# Patient Record
Sex: FEMALE | Race: WHITE | URBAN - METROPOLITAN AREA
[De-identification: names, ages, dates, MRNs, and addresses within clinical notes are randomized per-mention and may not be internally consistent; named-entity substitution may affect disease eponyms.]

---

## 2017-12-07 ENCOUNTER — OFFICE VISIT - RIVER FALLS (OUTPATIENT)
Dept: FAMILY MEDICINE | Facility: CLINIC | Age: 22
End: 2017-12-07

## 2017-12-07 ENCOUNTER — COMMUNICATION - RIVER FALLS (OUTPATIENT)
Dept: FAMILY MEDICINE | Facility: CLINIC | Age: 22
End: 2017-12-07

## 2017-12-07 ASSESSMENT — MIFFLIN-ST. JEOR: SCORE: 1628.78

## 2022-02-11 VITALS
DIASTOLIC BLOOD PRESSURE: 73 MMHG | HEIGHT: 67 IN | WEIGHT: 191.6 LBS | HEART RATE: 85 BPM | BODY MASS INDEX: 30.07 KG/M2 | TEMPERATURE: 97.7 F | OXYGEN SATURATION: 99 % | SYSTOLIC BLOOD PRESSURE: 114 MMHG

## 2022-02-16 NOTE — PROGRESS NOTES
Patient:   ERIN JOHNSTON            MRN: [id number]            FIN: [id number]               Age:   22 years     Sex:  Female     :  1995   Associated Diagnoses:   Acute sinus infection   Author:   Samantha Ennis      Visit Information      Date of Service: 2017 05:25 pm  Performing Location: [facility]  Encounter#: [id number]      Primary Care Provider (PCP):  NONE ,       Referring Provider:  Samantha Ennis    NPI# [id number]      Chief Complaint   2017 5:53 PM CST    Patient complains of sore throat, runny nose, cough, and ear pain on and off x6 weeks. Felt feverish and nauseated but hasn't taken temperature. Noticed left flank pain 10 days ago that is now becoming more persistent        History of Present Illness   reviewed presenting problem as above with patient  has never had mono  feels pain in upper teeth  is worried about her spleen, no trauma  no urinary symptoms  pmh of sinus infections in youth but hasn't been sick for  along time as she eats health  non smoker, no ETOH, healthy diet  She use [employer] student graduating in 10 days      Review of Systems   Constitutional:  No fever, No chills.    Ear/Nose/Mouth/Throat:  Nasal congestion, Sore throat, No ear pain.    Respiratory:  Cough, No shortness of breath, No wheezing.    Gastrointestinal:  No nausea, No vomiting, No diarrhea.              Health Status   Allergies:    Allergic Reactions (Selected)  Severe  Zithromax Z-Terrell (Hives and lip swelling)  High  Amoxicillin (Hives)  Penicillin (Hives)   Medications:  (Selected)   Prescriptions  Prescribed  Ceftin 500 mg oral tablet: 1 tab(s) ( 500 mg ), PO, BID, # 14 tab(s), 0 Refill(s), Type: Maintenance, Pharmacy: [id number], 1 tab(s) po bid,x7 day(s)  Documented Medications  Documented  Gisel: 1 tab(s), po, daily, 0 Refill(s), Type: Maintenance   Problem list:    All Problems  Obesity / SNOMED CT [id number] / Probable      Histories   Past Medical History:     No active or resolved past medical history items have been selected or recorded.   Family History:    No family history items have been selected or recorded.   Procedure history:    No active procedure history items have been selected or recorded.   Social History:             No active social history items have been recorded.      Physical Examination   Vital Signs   12/7/2017 5:53 PM CST Temperature Tympanic 97.7 DegF  LOW    Peripheral Pulse Rate 85 bpm    Pulse Site Brachial artery    HR Method Electronic    Systolic Blood Pressure 114 mmHg    Diastolic Blood Pressure 73 mmHg    Mean Arterial Pressure 87 mmHg    BP Site Right arm    BP Method Electronic    Oxygen Saturation 99 %      Measurements from flowsheet : Measurements   12/7/2017 5:53 PM CST Height Measured - Standard 66.5 in    Weight Measured - Standard 191.6 lb    BSA 2.02 m2    Body Mass Index 30.46 kg/m2      General:  Alert and oriented, No acute distress.    Eye:  Normal conjunctiva.    HENT:  Tympanic membranes are clear, Normal hearing, Oral mucosa is moist, No pharyngeal erythema, No sinus tenderness.    Neck:  Supple, Non-tender, shoddy ant chain lymph nodes.    Respiratory:  Lungs are clear to auscultation, Respirations are non-labored, Breath sounds are equal, Symmetrical chest wall expansion.    Cardiovascular:  Normal rate, Regular rhythm, No murmur.    Gastrointestinal:  Soft, Non-tender, Non-distended, Normal bowel sounds, No organomegaly.    Musculoskeletal:  Normal range of motion, Normal gait.    Integumentary:  Warm, Dry, Pink, No rash.    Neurologic:  Alert, Oriented.    Psychiatric:  Cooperative.       Review / Management   Results review:  Lab results   12/7/2017 6:28 PM CST WBC 11.7    RBC 4.22    Hgb 13.8 g/dL    Hct 39.6 %    MCV 94 fL    MCH 32.7 pg    MCHC 34.8 g/dL    RDW 12.1 %    Platelet 352    MPV 9.8 fL    Lymphocytes 23.1 %    Abs Lymphocytes 2.7    Neutrophils 69.4 %    Abs Neutrophils 8.1    Monocytes 5.6 %    Abs  Monocytes 0.7    Eosinophils 1.5 %    Abs Eosinophils 0.2    Basophils 0.4 %    Abs Basophils 0.1    Heterophile Ab Negative   .       Impression and Plan   Diagnosis     Acute sinus infection ([id number] J01.90).     Patient Instructions:       Counseled: Patient, Regarding diagnosis, Regarding treatment, Regarding medications, Verbalized understanding, Counseled on symptomatic management. Return to clinic for re evaluation if worsening, simply not improving, or failure to resolve.   .    Orders     Orders (Selected)   Prescriptions  Prescribed  Ceftin 500 mg oral tablet: 1 tab(s) ( 500 mg ), PO, BID, # 14 tab(s), 0 Refill(s), Type: Maintenance, Pharmacy: [id number], 1 tab(s) po bid,x7 day(s).